# Patient Record
Sex: MALE | Race: WHITE | NOT HISPANIC OR LATINO
[De-identification: names, ages, dates, MRNs, and addresses within clinical notes are randomized per-mention and may not be internally consistent; named-entity substitution may affect disease eponyms.]

---

## 2017-03-02 ENCOUNTER — RECORDS - HEALTHEAST (OUTPATIENT)
Dept: ADMINISTRATIVE | Facility: OTHER | Age: 72
End: 2017-03-02

## 2018-01-01 ENCOUNTER — AMBULATORY - HEALTHEAST (OUTPATIENT)
Dept: SCHEDULING | Facility: CLINIC | Age: 73
End: 2018-01-01

## 2018-01-01 ENCOUNTER — RECORDS - HEALTHEAST (OUTPATIENT)
Dept: ADMINISTRATIVE | Facility: OTHER | Age: 73
End: 2018-01-01

## 2018-01-01 ENCOUNTER — OFFICE VISIT - HEALTHEAST (OUTPATIENT)
Dept: GERIATRICS | Facility: CLINIC | Age: 73
End: 2018-01-01

## 2018-01-01 ENCOUNTER — COMMUNICATION - HEALTHEAST (OUTPATIENT)
Dept: SLEEP MEDICINE | Facility: CLINIC | Age: 73
End: 2018-01-01

## 2018-01-01 ENCOUNTER — OFFICE VISIT - HEALTHEAST (OUTPATIENT)
Dept: SLEEP MEDICINE | Facility: CLINIC | Age: 73
End: 2018-01-01

## 2018-01-01 ENCOUNTER — AMBULATORY - HEALTHEAST (OUTPATIENT)
Dept: SLEEP MEDICINE | Facility: CLINIC | Age: 73
End: 2018-01-01

## 2018-01-01 ENCOUNTER — RECORDS - HEALTHEAST (OUTPATIENT)
Dept: LAB | Facility: CLINIC | Age: 73
End: 2018-01-01

## 2018-01-01 ENCOUNTER — OFFICE VISIT - HEALTHEAST (OUTPATIENT)
Dept: PULMONOLOGY | Facility: OTHER | Age: 73
End: 2018-01-01

## 2018-01-01 ENCOUNTER — AMBULATORY - HEALTHEAST (OUTPATIENT)
Dept: GERIATRICS | Facility: CLINIC | Age: 73
End: 2018-01-01

## 2018-01-01 DIAGNOSIS — R09.02 HYPOXIA: ICD-10-CM

## 2018-01-01 DIAGNOSIS — G47.33 OBSTRUCTIVE SLEEP APNEA: ICD-10-CM

## 2018-01-01 DIAGNOSIS — R29.818 SUSPECTED SLEEP APNEA: ICD-10-CM

## 2018-01-01 DIAGNOSIS — J44.9 COPD (CHRONIC OBSTRUCTIVE PULMONARY DISEASE) (H): ICD-10-CM

## 2018-01-01 DIAGNOSIS — J18.9 PNEUMONIA: ICD-10-CM

## 2018-01-01 DIAGNOSIS — J90 PLEURAL EFFUSION: ICD-10-CM

## 2018-01-01 DIAGNOSIS — I50.9 CHF EXACERBATION (H): ICD-10-CM

## 2018-01-01 DIAGNOSIS — J44.1 COPD EXACERBATION (H): ICD-10-CM

## 2018-01-01 DIAGNOSIS — J96.20 ACUTE ON CHRONIC RESPIRATORY FAILURE (H): ICD-10-CM

## 2018-01-01 DIAGNOSIS — R33.9 URINARY RETENTION: ICD-10-CM

## 2018-01-01 DIAGNOSIS — G47.10 HYPERSOMNIA: ICD-10-CM

## 2018-01-01 DIAGNOSIS — J42 CHRONIC BRONCHITIS (H): ICD-10-CM

## 2018-01-01 DIAGNOSIS — R06.83 SNORING: ICD-10-CM

## 2018-01-01 DIAGNOSIS — I26.09 ACUTE COR PULMONALE (H): ICD-10-CM

## 2018-01-01 LAB
ANION GAP SERPL CALCULATED.3IONS-SCNC: 5 MMOL/L (ref 5–18)
ANION GAP SERPL CALCULATED.3IONS-SCNC: 8 MMOL/L (ref 5–18)
BUN SERPL-MCNC: 16 MG/DL (ref 8–28)
BUN SERPL-MCNC: 32 MG/DL (ref 8–28)
CALCIUM SERPL-MCNC: 8.5 MG/DL (ref 8.5–10.5)
CALCIUM SERPL-MCNC: 8.6 MG/DL (ref 8.5–10.5)
CHLORIDE BLD-SCNC: 97 MMOL/L (ref 98–107)
CHLORIDE BLD-SCNC: 98 MMOL/L (ref 98–107)
CO2 SERPL-SCNC: 38 MMOL/L (ref 22–31)
CO2 SERPL-SCNC: 42 MMOL/L (ref 22–31)
CREAT SERPL-MCNC: 0.97 MG/DL (ref 0.7–1.3)
CREAT SERPL-MCNC: 1.16 MG/DL (ref 0.7–1.3)
GFR SERPL CREATININE-BSD FRML MDRD: >60 ML/MIN/1.73M2
GFR SERPL CREATININE-BSD FRML MDRD: >60 ML/MIN/1.73M2
GLUCOSE BLD-MCNC: 101 MG/DL (ref 70–125)
GLUCOSE BLD-MCNC: 107 MG/DL (ref 70–125)
HGB BLD-MCNC: 10.4 G/DL (ref 14–18)
HGB BLD-MCNC: 9.4 G/DL (ref 14–18)
POTASSIUM BLD-SCNC: 3.6 MMOL/L (ref 3.5–5)
POTASSIUM BLD-SCNC: 3.6 MMOL/L (ref 3.5–5)
SODIUM SERPL-SCNC: 144 MMOL/L (ref 136–145)
SODIUM SERPL-SCNC: 144 MMOL/L (ref 136–145)

## 2018-01-01 RX ORDER — CLOPIDOGREL BISULFATE 75 MG/1
75 TABLET ORAL
Status: SHIPPED | COMMUNITY
Start: 2018-01-01

## 2018-01-01 RX ORDER — ISOSORBIDE MONONITRATE 30 MG/1
30 TABLET, EXTENDED RELEASE ORAL
Status: SHIPPED | COMMUNITY
Start: 2018-01-01

## 2018-01-01 ASSESSMENT — MIFFLIN-ST. JEOR
SCORE: 1905.62
SCORE: 1897.46

## 2018-11-21 ENCOUNTER — RECORDS - HEALTHEAST (OUTPATIENT)
Dept: ADMINISTRATIVE | Facility: OTHER | Age: 73
End: 2018-11-21

## 2021-06-01 ENCOUNTER — RECORDS - HEALTHEAST (OUTPATIENT)
Dept: ADMINISTRATIVE | Facility: CLINIC | Age: 76
End: 2021-06-01

## 2021-06-02 ENCOUNTER — RECORDS - HEALTHEAST (OUTPATIENT)
Dept: ADMINISTRATIVE | Facility: CLINIC | Age: 76
End: 2021-06-02

## 2021-06-02 VITALS — HEIGHT: 70 IN | BODY MASS INDEX: 36.65 KG/M2 | WEIGHT: 256 LBS

## 2021-06-02 VITALS — HEIGHT: 70 IN | WEIGHT: 257.8 LBS | BODY MASS INDEX: 36.91 KG/M2

## 2021-06-16 PROBLEM — J44.1 COPD EXACERBATION (H): Status: ACTIVE | Noted: 2018-01-01

## 2021-06-16 PROBLEM — J96.22 ACUTE ON CHRONIC RESPIRATORY FAILURE WITH HYPOXIA AND HYPERCAPNIA (H): Status: ACTIVE | Noted: 2018-01-01

## 2021-06-16 PROBLEM — I50.9 CHF EXACERBATION (H): Status: ACTIVE | Noted: 2018-01-01

## 2021-06-16 PROBLEM — J96.21 ACUTE ON CHRONIC RESPIRATORY FAILURE WITH HYPOXIA AND HYPERCAPNIA (H): Status: ACTIVE | Noted: 2018-01-01

## 2021-06-20 NOTE — PROGRESS NOTES
Code Status:  UNKNOWN  Visit Type: Problem Visit     Facility:  [facility] [[id number]]         Facility Type: SNF (Skilled Nursing Facility, TCU)    History of Present Illness: Gurmeet Nettles is a 73 y.o. male when seen today for follow-up on the TCU.  Patient recently hospitalized on 8/20/2018.  Past medical history includes coronary artery disease status post CABG, COPD on home oxygen, obstructive sleep apnea, hypertension, hyperlipidemia and lung CA.  Patient recently hospitalized with increasing shortness of breath.  He was started on BiPAP.  He was treated for both COPD exacerbation as well as CHF exacerbation.  He underwent left thoracentesis with removal of 1 L of blood-tinged fluid from pleural effusion.  He continues on diuretics.  Continues with lower extremity edema 2+.  He is on 6-8 L of oxygen.  He reports shortness of breath is improving.  He does seem to recover quite rapidly.  During hospitalization he did have urinary retention.  Continues with Hernandez catheter.  Was also treated for possible pneumonia with oral antibiotics.  His weight is down about 9 pounds.    Today patient sitting up in bedside chair.  He is tolerating therapy.  His oxygen has been weaned down to 4 L nasal cannula.  Continues on CPAP at night. He continues with lower extremity edema on Lasix.  Patient denies any chest pain.  Feels shortness of breath is improving however continues with chronic shortness of breath with exertion.      Active Ambulatory Problems     Diagnosis Date Noted     Dyslipidemia      Obstructive sleep apnea      Essential hypertension      Coronary Artery Disease      Chronic bronchitis (H)      CHF exacerbation (H) 08/20/2018     COPD exacerbation (H) 08/20/2018     Acute on chronic respiratory failure with hypoxia and hypercapnia (H) 08/20/2018     Acute cor pulmonale (H)      Pleural effusion      Resolved Ambulatory Problems     Diagnosis Date Noted     No Resolved Ambulatory Problems      Past Medical History:   Diagnosis Date     CAD (coronary artery disease)      COPD (chronic obstructive pulmonary disease) (H)      Emphysema of lung (H)      Hyperlipidemia      Hypertension      Lung cancer (H)      RAVI (obstructive sleep apnea)        Current Outpatient Prescriptions   Medication Sig Note     albuterol (PROAIR HFA;PROVENTIL HFA;VENTOLIN HFA) 90 mcg/actuation inhaler Inhale 2 puffs every 6 (six) hours as needed for wheezing or shortness of breath.      aspirin 81 MG EC tablet Take 81 mg by mouth daily.      atorvastatin (LIPITOR) 40 MG tablet Take 40 mg by mouth at bedtime.      budesonide (PULMICORT) 180 mcg/actuation inhaler Inhale 1 puff 2 (two) times a day.      cholecalciferol, vitamin D3, 1,000 unit tablet Take 1,000 Units by mouth daily.      formoterol fumarate (PERFOROMIST) 20 mcg/2 mL nebulizer solution Take 20 mcg by nebulization every 12 (twelve) hours.      furosemide (LASIX) 80 MG tablet Take 80 mg by mouth 2 (two) times a day at 9am and 6pm.      ipratropium-albuterol (DUO-NEB) 0.5-2.5 mg/3 mL nebulizer Take 3 mL by nebulization 4 (four) times a day.      isosorbide mononitrate (IMDUR) 30 MG 24 hr tablet Take 30 mg by mouth daily. 8/21/2018: Last filled June 2018     lisinopril (PRINIVIL,ZESTRIL) 40 MG tablet Take 40 mg by mouth daily.      metoprolol succinate (TOPROL-XL) 100 MG 24 hr tablet Take 100 mg by mouth daily.      potassium chloride (KLOR-CON) 20 mEq packet Take 20 mEq by mouth 2 (two) times a day.      predniSONE (DELTASONE) 10 mg tablet 3 tabs daily for 3 days then 2 tabs daily for 3 days then 1 tab daily for 3 days then stop.      tamsulosin (FLOMAX) 0.4 mg cap Take 1 capsule (0.4 mg total) by mouth Daily after lunch.          Review of Systems   No fevers or chills. No headache, lightheadedness or dizziness.  Chronic SOB on oxygen at home, however feels shortness of breath is improving somewhat, no chest pains or palpitations. Appetite is good. No nausea,  vomiting, constipation or diarrhea.  Urinary retention with Hernandez catheter.  Tells me he did not have a catheter at home.  Chronic lower extremity edema.  Otherwise review of systems are negative.       Physical Exam   PHYSICAL EXAMINATION:  Vital signs: /62, heart rate 95, respirations 20, temperature 98.3, O2 sat 93% on 4 L.  Current weight 259..2 pounds.  General: Awake, Alert, oriented x3, appropriately, follows simple commands, conversant  HEENT:PERRLA, Pink conjunctiva, anicteric sclerae, moist oral mucosa  NECK: Supple, without any lymphadenopathy, or masses  CVS:  S1  S2, without murmur or gallop.   LUNG: Clear to auscultation.  No wheezes.  Occasional rhonchi that clears with cough.  O2 on at 4 L nasal cannula.  BACK: No kyphosis of the thoracic spine  ABDOMEN: Soft, nontender to palpation, with positive bowel sounds  : Hernandez catheter in place.:   EXTREMITIES: Moves  both upper and lower extremities with generalized weakness, 2+ pedal edema, no calf tenderness  SKIN: Warm and dry, no rashes or erythema noted  NEUROLOGIC: Intact, pulses palpable  PSYCHIATRIC: Cognition intact            Labs:    Recent Results (from the past 240 hour(s))   Basic Metabolic Panel   Result Value Ref Range    Sodium 142 136 - 145 mmol/L    Potassium 3.7 3.5 - 5.0 mmol/L    Chloride 93 (L) 98 - 107 mmol/L    CO2 44 (HH) 22 - 31 mmol/L    Anion Gap, Calculation 5 5 - 18 mmol/L    Glucose 109 70 - 125 mg/dL    Calcium 8.6 8.5 - 10.5 mg/dL    BUN 35 (H) 8 - 28 mg/dL    Creatinine 1.14 0.70 - 1.30 mg/dL    GFR MDRD Af Amer >60 >60 mL/min/1.73m2    GFR MDRD Non Af Amer >60 >60 mL/min/1.73m2   Platelet Count - every other day x 3   Result Value Ref Range    Platelets 164 140 - 440 thou/uL   Basic Metabolic Panel   Result Value Ref Range    Sodium 144 136 - 145 mmol/L    Potassium 3.6 3.5 - 5.0 mmol/L    Chloride 97 (L) 98 - 107 mmol/L    CO2 42 (HH) 22 - 31 mmol/L    Anion Gap, Calculation 5 5 - 18 mmol/L    Glucose 101 70 -  125 mg/dL    Calcium 8.6 8.5 - 10.5 mg/dL    BUN 32 (H) 8 - 28 mg/dL    Creatinine 1.16 0.70 - 1.30 mg/dL    GFR MDRD Af Amer >60 >60 mL/min/1.73m2    GFR MDRD Non Af Amer >60 >60 mL/min/1.73m2   Hemoglobin   Result Value Ref Range    Hemoglobin 9.4 (L) 14.0 - 18.0 g/dL   Basic Metabolic Panel   Result Value Ref Range    Sodium 144 136 - 145 mmol/L    Potassium 3.6 3.5 - 5.0 mmol/L    Chloride 98 98 - 107 mmol/L    CO2 38 (H) 22 - 31 mmol/L    Anion Gap, Calculation 8 5 - 18 mmol/L    Glucose 107 70 - 125 mg/dL    Calcium 8.5 8.5 - 10.5 mg/dL    BUN 16 8 - 28 mg/dL    Creatinine 0.97 0.70 - 1.30 mg/dL    GFR MDRD Af Amer >60 >60 mL/min/1.73m2    GFR MDRD Non Af Amer >60 >60 mL/min/1.73m2   Hemoglobin   Result Value Ref Range    Hemoglobin 10.4 (L) 14.0 - 18.0 g/dL         Assessment/Plan:  1. COPD exacerbation (H)     2. CHF exacerbation (H)     3. Acute on chronic respiratory failure (H)     4. Urinary retention     5. Pleural effusion     6. Obstructive sleep apnea       Patient with recent hospitalization for COPD as well as CHF exacerbation.  He is chronically on O2 at home 5 L.  Oxygen down to 4 L.  He continues to wear O2 at 6 L with CPAP at night.  He feels like the shortness of breath is improving somewhat.  Chronic with exertion.  CHF.  Continues with lower extremity edema.  He is on Lasix 80 mg twice daily.  This is his home dose.  He is down about 9 pounds.  Blood pressures on the lower side of normal.  He is asymptomatic.Hemoglobin 10.4.  Urinary retention during hospitalization.  He does continue Hernandez catheter in place.  Will DC his Hernandez catheter and initiate PVR protocol.        Electronically signed by: Kusum Reynaga, ESTEFANI

## 2021-06-20 NOTE — PROGRESS NOTES
Code Status:  UNKNOWN  Visit Type: Problem Visit     Facility:  [facility] [[id number]]         Facility Type: SNF (Skilled Nursing Facility, TCU)    History of Present Illness: Gurmeet Nettles is a 73 y.o. male when seen today for follow-up on the TCU.  Patient recently hospitalized on 820/2018.  Past medical history includes coronary artery disease status post CABG, COPD on home oxygen, obstructive sleep apnea, hypertension, hyperlipidemia and lung CA.  Patient recently hospitalized with increasing shortness of breath.  He was started on BiPAP.  He was treated for both COPD exacerbation as well as CHF exacerbation.  He underwent left thoracentesis with removal of 1 L of blood-tinged fluid from pleural effusion.  He continues on diuretics.  Continues with lower extremity edema 2+.  He is on 6-8 L of oxygen.  He reports shortness of breath is improving.  He does seem to recover quite rapidly.  During hospitalization he did have urinary retention.  Continues with Hernandez catheter.  Was also treated for possible pneumonia with oral antibiotics.  His weight is down about 9 pounds.      Active Ambulatory Problems     Diagnosis Date Noted     Dyslipidemia      Obstructive sleep apnea      Essential hypertension      Coronary Artery Disease      Chronic bronchitis (H)      CHF exacerbation (H) 08/20/2018     COPD exacerbation (H) 08/20/2018     Acute on chronic respiratory failure with hypoxia and hypercapnia (H) 08/20/2018     Acute cor pulmonale (H)      Pleural effusion      Resolved Ambulatory Problems     Diagnosis Date Noted     No Resolved Ambulatory Problems     Past Medical History:   Diagnosis Date     CAD (coronary artery disease)      COPD (chronic obstructive pulmonary disease) (H)      Emphysema of lung (H)      Hyperlipidemia      Hypertension      Lung cancer (H)      RAVI (obstructive sleep apnea)        Current Outpatient Prescriptions   Medication Sig Note     albuterol (PROAIR  HFA;PROVENTIL HFA;VENTOLIN HFA) 90 mcg/actuation inhaler Inhale 2 puffs every 6 (six) hours as needed for wheezing or shortness of breath.      aspirin 81 MG EC tablet Take 81 mg by mouth daily.      atorvastatin (LIPITOR) 40 MG tablet Take 40 mg by mouth at bedtime.      budesonide (PULMICORT) 180 mcg/actuation inhaler Inhale 1 puff 2 (two) times a day.      cholecalciferol, vitamin D3, 1,000 unit tablet Take 1,000 Units by mouth daily.      formoterol fumarate (PERFOROMIST) 20 mcg/2 mL nebulizer solution Take 20 mcg by nebulization every 12 (twelve) hours.      furosemide (LASIX) 80 MG tablet Take 80 mg by mouth 2 (two) times a day at 9am and 6pm.      ipratropium-albuterol (DUO-NEB) 0.5-2.5 mg/3 mL nebulizer Take 3 mL by nebulization 4 (four) times a day.      isosorbide mononitrate (IMDUR) 30 MG 24 hr tablet Take 30 mg by mouth daily. 8/21/2018: Last filled June 2018     lisinopril (PRINIVIL,ZESTRIL) 40 MG tablet Take 40 mg by mouth daily.      metoprolol succinate (TOPROL-XL) 100 MG 24 hr tablet Take 100 mg by mouth daily.      potassium chloride (KLOR-CON) 20 mEq packet Take 20 mEq by mouth 2 (two) times a day.      predniSONE (DELTASONE) 10 mg tablet 3 tabs daily for 3 days then 2 tabs daily for 3 days then 1 tab daily for 3 days then stop.      tamsulosin (FLOMAX) 0.4 mg cap Take 1 capsule (0.4 mg total) by mouth Daily after lunch.          Review of Systems   No fevers or chills. No headache, lightheadedness or dizziness.  Chronic SOB on oxygen at home, no chest pains or palpitations. Appetite is good. No nausea, vomiting, constipation or diarrhea.  Urinary retention with Hernandez catheter.  Chronic lower extremity edema.  Otherwise review of systems are negative.       Physical Exam   PHYSICAL EXAMINATION:  Vital signs: /74, heart rate 101, respirations 22, O2 sat 94% on 6 L, temperature 98.3.  Current weight 265 pounds.  General: Awake, Alert, oriented x3, appropriately, follows simple commands,  conversant  HEENT:PERRLA, Pink conjunctiva, anicteric sclerae, moist oral mucosa  NECK: Supple, without any lymphadenopathy, or masses  CVS:  S1  S2, without murmur or gallop.   LUNG: Clear to auscultation, No wheezes, rales or rhonci.  BACK: No kyphosis of the thoracic spine  ABDOMEN: Soft, nontender to palpation, with positive bowel sounds  : Hernandez catheter in place.:   EXTREMITIES: Moves  both upper and lower extremities with generalized weakness, 2+ pedal edema, no calf tenderness  SKIN: Warm and dry, no rashes or erythema noted  NEUROLOGIC: Intact, pulses palpable  PSYCHIATRIC: Cognition intact            Labs:    Recent Results (from the past 240 hour(s))   Basic Metabolic Panel   Result Value Ref Range    Sodium 142 136 - 145 mmol/L    Potassium 3.7 3.5 - 5.0 mmol/L    Chloride 93 (L) 98 - 107 mmol/L    CO2 44 (HH) 22 - 31 mmol/L    Anion Gap, Calculation 5 5 - 18 mmol/L    Glucose 109 70 - 125 mg/dL    Calcium 8.6 8.5 - 10.5 mg/dL    BUN 35 (H) 8 - 28 mg/dL    Creatinine 1.14 0.70 - 1.30 mg/dL    GFR MDRD Af Amer >60 >60 mL/min/1.73m2    GFR MDRD Non Af Amer >60 >60 mL/min/1.73m2   Platelet Count - every other day x 3   Result Value Ref Range    Platelets 164 140 - 440 thou/uL   Basic Metabolic Panel   Result Value Ref Range    Sodium 144 136 - 145 mmol/L    Potassium 3.6 3.5 - 5.0 mmol/L    Chloride 97 (L) 98 - 107 mmol/L    CO2 42 (HH) 22 - 31 mmol/L    Anion Gap, Calculation 5 5 - 18 mmol/L    Glucose 101 70 - 125 mg/dL    Calcium 8.6 8.5 - 10.5 mg/dL    BUN 32 (H) 8 - 28 mg/dL    Creatinine 1.16 0.70 - 1.30 mg/dL    GFR MDRD Af Amer >60 >60 mL/min/1.73m2    GFR MDRD Non Af Amer >60 >60 mL/min/1.73m2   Hemoglobin   Result Value Ref Range    Hemoglobin 9.4 (L) 14.0 - 18.0 g/dL         Assessment/Plan:  1. COPD exacerbation (H)     2. CHF exacerbation (H)     3. Acute on chronic respiratory failure (H)     4. Obstructive sleep apnea     5. Pleural effusion     6. Pneumonia     7. Urinary retention        Patient with recent hospitalization for COPD as well as CHF exacerbation.  He is chronically on O2 at home 5 L.  Now 6-8 L.  Obstructive sleep apnea.  He does use 8 L of oxygen with his BiPAP at night.  He did undergo thoracentesis and had 1 L fluid removed.  Was treated for pneumonia with oral antibiotics.  He has completed this.  He does continue on Lasix 80 mg twice daily.  He is on a steroid taper.  Lung sounds diminished in the bases.  No cough on exam.  He does have 2-3+ edema in his lower extremities.  He is down about 9 pounds since admit.  We will continue to monitor daily weights.  We will follow-up with laboratory next week including CBC and BMP and BNP.  Urinary retention during hospitalization.  He does continue Hernandez catheter in place.  Will attempt to DC his Hernandez catheter.  He is on Flomax.        35 minutes spent of which greater than 50% was face to face communication with the patient about above plan of care    Electronically signed by: Kusum Reynaga CNP

## 2021-06-20 NOTE — PROGRESS NOTES
Order for sleep study received and reviewed.  This patient sees Dr. Mcadams at [facility] and has an established history with her. It seems like patient already contacted [facility] regarding his equipment.

## 2021-06-20 NOTE — PROGRESS NOTES
[facility]      Facility:    [facility] [[id number]]  Code Status: UNKNOWN      Chief Complaint/Reason for Visit:  Chief Complaint   Patient presents with     H & P     CHF exacerbation, COPD exacerbation, chronic bronchitis, obstructive sleep apnea, essential hypertension, pleural effusion, possible pneumonia.  Acute cor pulmonale, acute on chronic respiratory failure with hypoxia as well as hypercapnia.       HPI:   Gurmeet is a 73 y.o. male who was recently admitted to the hospital 8/20/2018.  He does have multiple medical problems including coronary disease with status post CABG as well as severe COPD who is on 5 L during the day of home oxygen.  He has obstructive sleep apnea and is on CPAP at night.  He does a history of squamous cell carcinoma status post radiation therapy as well as cardiomyopathy.    He came to the hospital on the above admission date with increasing shortness of breath.  He was started on BiPAP and was known to have an exacerbation of CHF as well as likely exacerbation of COPD.  He was diuresed put on BiPAP and underwent left thoracentesis for removal of 1 L of blood-tinged fluid for pleural effusion.  Symptoms did improve with this but however his hospitalization was complicated by urinary retention and hematuria from traumatic Hernandez insertion and nocturnal hypoxemia with his CPAP.  Overall he did improve his severe COPD with chronic hypoxia and decrease steroids by 10 mg every 3 days was recommended.  He likely had some pneumonia and was put on Levaquin ×5 days and is chronic diastolic heart failure.  He did have exudative pleural effusion and he was treated appropriately and transferred here to the TCU at [facility] in stable condition.    Times today that his breathing is getting better but is not back to his baseline function he claims overall goal he wants to be better than he was in his normal state.  Given the fact that he has  COPD as well as congestive heart failure and recent pneumonia we will do our best to reach his goals.  Otherwise he is doing okay he has severe lower extremity edema at this time he claims is about baseline but he thinks it is a little bit worse at this time.    Past Medical History:  Past Medical History:   Diagnosis Date     CAD (coronary artery disease)      COPD (chronic obstructive pulmonary disease) (H)      Emphysema of lung (H)      Hyperlipidemia      Hypertension      Lung cancer (H)      RAVI (obstructive sleep apnea)            Surgical History:  Past Surgical History:   Procedure Laterality Date     CARDIAC CATHETERIZATION      stent to LAD     VITRECTOMY         Family History:   History reviewed. No pertinent family history.    Social History:    Social History     Social History     Marital status:      Spouse name: N/A     Number of children: N/A     Years of education: N/A     Social History Main Topics     Smoking status: Former Smoker     Smokeless tobacco: Never Used     Alcohol use No     Drug use: No     Sexual activity: Not Asked     Other Topics Concern     None     Social History Narrative          Review of Systems   Constitutional:        Patient denies any pain fevers chills nausea vomiting diarrhea change in vision hearing taste or smell weakness one side the other chest pain.  He does have shortness of breath chronically and he was supposed on 8 L at night with his CPAP.  This is what he was on at home and that was reiterated in the discharge summary.  The remainder the review of systems is negative.       Vitals:    08/29/18 1233   BP: 106/45   Pulse: 92   Resp: 16   Temp: 99.1  F (37.3  C)   SpO2: 93%       Physical Exam   Constitutional: No distress.   HENT:   Head: Normocephalic and atraumatic.   Nose: Nose normal.   Mouth/Throat: No oropharyngeal exudate.   Eyes: Conjunctivae are normal. Right eye exhibits no discharge. Left eye exhibits no discharge. No scleral icterus.    Neck: Neck supple. No thyromegaly present.   Cardiovascular: Normal rate and regular rhythm.    Pulmonary/Chest: Effort normal.   Patient had decreased inspiratory volume at this time with decreased lung sounds in the left lower lobe.   Abdominal: Soft. Bowel sounds are normal. He exhibits no distension.   Musculoskeletal: He exhibits edema.   Neurological: He is alert. No cranial nerve deficit. He exhibits normal muscle tone.   Skin: Skin is warm and dry. He is not diaphoretic.   Psychiatric: He has a normal mood and affect. His behavior is normal.       Medication List:  Current Outpatient Prescriptions   Medication Sig     albuterol (PROAIR HFA;PROVENTIL HFA;VENTOLIN HFA) 90 mcg/actuation inhaler Inhale 2 puffs every 6 (six) hours as needed for wheezing or shortness of breath.     aspirin 81 MG EC tablet Take 81 mg by mouth daily.     atorvastatin (LIPITOR) 40 MG tablet Take 40 mg by mouth at bedtime.     budesonide (PULMICORT) 180 mcg/actuation inhaler Inhale 1 puff 2 (two) times a day.     cholecalciferol, vitamin D3, 1,000 unit tablet Take 1,000 Units by mouth daily.     formoterol fumarate (PERFOROMIST) 20 mcg/2 mL nebulizer solution Take 20 mcg by nebulization every 12 (twelve) hours.     furosemide (LASIX) 80 MG tablet Take 80 mg by mouth 2 (two) times a day at 9am and 6pm.     ipratropium-albuterol (DUO-NEB) 0.5-2.5 mg/3 mL nebulizer Take 3 mL by nebulization 4 (four) times a day.     isosorbide mononitrate (IMDUR) 30 MG 24 hr tablet Take 30 mg by mouth daily.     levoFLOXacin (LEVAQUIN) 750 MG tablet Take 1 tablet (750 mg total) by mouth daily for 1 day.     lisinopril (PRINIVIL,ZESTRIL) 40 MG tablet Take 40 mg by mouth daily.     metoprolol succinate (TOPROL-XL) 100 MG 24 hr tablet Take 100 mg by mouth daily.     potassium chloride (KLOR-CON) 20 mEq packet Take 20 mEq by mouth 2 (two) times a day.     predniSONE (DELTASONE) 10 mg tablet 3 tabs daily for 3 days then 2 tabs daily for 3 days then 1 tab  daily for 3 days then stop.     tamsulosin (FLOMAX) 0.4 mg cap Take 1 capsule (0.4 mg total) by mouth Daily after lunch.       Labs: Not available in the EMR.      Assessment:    ICD-10-CM    1. COPD exacerbation (H) J44.1    2. CHF exacerbation (H) I50.9    3. Acute on chronic respiratory failure (H) J96.20    4. Obstructive sleep apnea G47.33    5. Pleural effusion J90    6. Chronic bronchitis (H) J42    7. Acute cor pulmonale (H) I26.09        Plan: Plan at this time will check basic metabolic profile hemoglobin tomorrow secondary to hematoma and bloody pleural effusion.  Basic metabolic profile is done secondary Lasix therapy.  If he can we will check his weights on a daily basis notified of any 2 pound weight changes.  There are just diuresis him a little bit more but I need to know what his basic metabolic profile his kidney function is at this time.  His practitioner will follow up tomorrow on this.  This is Baldev August end of dictation.        Electronically signed by: Baldev August,

## 2021-06-21 NOTE — PROGRESS NOTES
Dear Dr. Chloe Motta, Do  [address]  [address]  [place] [address]    Thank you for the opportunity to participate in the care of . Gurmeet Nettles.    He is a 73 y.o. male who comes to the clinic for the transfer of care of his obstructive sleep apnea.  From the best of what I can decipher from the patient's recollection as well as the notes from care everywhere.  The patient has been diagnosed with severe COPD in the past and is on nasal cannula oxygen both during the daytime and nighttime.  There is also a history of pulmonary carcinoma status post radiation therapy.  He also has a history of coronary disease as well as heart failure.  The patient was recently hospitalized and had an extended stay at a skilled nursing facility in which he mentions that 1 of his lungs had such severe pleural effusion that he needed to be drained.  We do not have the original sleep study here in clinic today and he did not bring his CPAP machine with him.  He did bring his nasal cannula oxygen with him today.  He was getting his pulmonary care from [facility] but declined his pulmonary physicians offered to get another titration study and [facility] to qualify him for a higher cognitive level machine.  He would like to switch both his pulmonary and sleep care over to [facility].  We will make every attempt to try to obtain the original sleep study through release of information.  Without pressure therapy the patient states that he would be extremely tired during the day.  He would also have significant pauses in his breathing during sleep followed by loud snoring.  The patient did appear extremely short of breath despite being on nasal cannula oxygen during this clinic visit.  I did offer the patient the option of being evaluated by the emergency room but he declined.  His oxygen level did improve after he was able to sit down and calm down a bit.  Patient's review of system is otherwise unremarkable.     Ideal  Sleep-Wake Cycle(devoid of societal pressure):    Patient would try to initiate sleep at around 10 PM with a sleep latency of 10 minutes. The patient would have 4 nocturnal awakening. Final wake up time is around 6 AM.    Past Medical History  Past Medical History:   Diagnosis Date     CAD (coronary artery disease)      COPD (chronic obstructive pulmonary disease) (H)      Emphysema of lung (H)      Hyperlipidemia      Hypertension      Lung cancer (H)      RAVI (obstructive sleep apnea)         Past Surgical History  Past Surgical History:   Procedure Laterality Date     CARDIAC CATHETERIZATION      stent to LAD     VITRECTOMY          Meds  Current Outpatient Prescriptions   Medication Sig Dispense Refill     albuterol (PROAIR HFA;PROVENTIL HFA;VENTOLIN HFA) 90 mcg/actuation inhaler Inhale 2 puffs every 6 (six) hours as needed for wheezing or shortness of breath.       aspirin 81 MG EC tablet Take 81 mg by mouth daily.       atorvastatin (LIPITOR) 40 MG tablet Take 40 mg by mouth at bedtime.       budesonide (PULMICORT) 180 mcg/actuation inhaler Inhale 1 puff 2 (two) times a day.       cholecalciferol, vitamin D3, 1,000 unit tablet Take 1,000 Units by mouth daily.       clopidogrel (PLAVIX) 75 mg tablet Take 75 mg by mouth.       formoterol fumarate (PERFOROMIST) 20 mcg/2 mL nebulizer solution Take 20 mcg by nebulization every 12 (twelve) hours.       furosemide (LASIX) 80 MG tablet Take 80 mg by mouth 2 (two) times a day at 9am and 6pm.       ipratropium-albuterol (DUO-NEB) 0.5-2.5 mg/3 mL nebulizer Take 3 mL by nebulization 4 (four) times a day. 3 vial 0     isosorbide mononitrate (IMDUR) 30 MG 24 hr tablet Take 30 mg by mouth daily.       isosorbide mononitrate (IMDUR) 30 MG 24 hr tablet Take 30 mg by mouth.       lisinopril (PRINIVIL,ZESTRIL) 40 MG tablet Take 40 mg by mouth daily.       metoprolol succinate (TOPROL-XL) 100 MG 24 hr tablet Take 100 mg by mouth daily.       potassium chloride (KLOR-CON) 20 mEq  packet Take 20 mEq by mouth 2 (two) times a day. 1 packet 0     predniSONE (DELTASONE) 10 mg tablet 3 tabs daily for 3 days then 2 tabs daily for 3 days then 1 tab daily for 3 days then stop. 18 tablet 0     tamsulosin (FLOMAX) 0.4 mg cap Take 1 capsule (0.4 mg total) by mouth Daily after lunch. 30 capsule 0     No current facility-administered medications for this visit.         Allergies  Review of patient's allergies indicates no known allergies.     Social History  Social History     Social History     Marital status:      Spouse name: N/A     Number of children: N/A     Years of education: N/A     Occupational History     Not on file.     Social History Main Topics     Smoking status: Former Smoker     Smokeless tobacco: Never Used     Alcohol use No     Drug use: No     Sexual activity: Not on file     Other Topics Concern     Not on file     Social History Narrative        Family History  No family history on file.     Review of Systems:  Constitutional: Negative except as noted in HPI.   Eyes: Negative except as noted in HPI.   ENT: Negative except as noted in HPI.   Cardiovascular: Negative except as noted in HPI.   Respiratory: Negative except as noted in HPI.   Gastrointestinal: Negative except as noted in HPI.   Genitourinary: Negative except as noted in HPI.   Musculoskeletal: Negative except as noted in HPI.   Integumentary: Negative except as noted in HPI.   Neurological: Negative except as noted in HPI.   Psychiatric: Negative except as noted in HPI.   Endocrine: Negative except as noted in HPI.   Hematologic/Lymphatic: Negative except as noted in HPI.      STOP BANG 10/26/2018   Do you snore loudly (louder than talking or loud enough to be heard through closed doors)? 0   Do you often feel tired, fatigued, or sleepy during daytime? 1   Has anyone observed you stop breathing in your sleep? 0   Do you have or are you being treated for high blood pressure? 1   BMI more than 35 kg/m2 1   Age over  "50 years old? 1   Neck circumference greater than 16 inches? 1   Gender male? 1   Total Score 6   Epworths Sleepiness Scale 10/26/2018   Sitting and reading 1   Watching TV 1   Sitting, inactive in a public place (e.g. a theatre or a meeting) 0   As a passenger in a car for an hour without a break 0   Lying down to rest in the afternoon when circumstances permit 0   Sitting and talking to someone 0   Sitting quietly after a lunch without alcohol 0   In a car, while stopped for a few minutes in traffic 0   Total score 2   Rooming 10/26/2018   Usual bedtime 10pm   Sleep Latency 10 minutes   Awakenings 4 times   Wake Up Time 4:30am   Weekends varies   Energy Drinks no   Coffee 4 cups qd   Cola no   Difficulty falling asleep No   Difficulty staying asleep No   Excessive daytime tiredness Yes   Excessive daytime sleepiness No   Dozing off while driving No   Shift Worker No   Sleep Walking? No   Sleep Talking? No   Kicking or punching? Yes   Restless legs symptoms No       Physical Exam:  /52  Pulse 70  Ht 5' 10\" (1.778 m)  Wt (!) 256 lb (116.1 kg)  SpO2 91%  BMI 36.73 kg/m2  BMI:Body mass index is 36.73 kg/(m^2).   GEN: NAD, the patient walks with a walker and has nasal cannula 5 L/min of oxygen bleeding in.  Head: Normocephalic.  EYES: PERRLA, EOMI  ENT: Oropharynx is clear, mallampatti class 4+ airway.   Nasal mucosa is moist without erythema  Neck : Thyroid is within normal limits. Neck size: 19.5 inches  CV: Regular rate and rhythm, S1 & S2 positive.  LUNGS: Bilateral breathsounds heard.   ABDOMEN: Positive bowel sounds in all quadrants, soft, no rebound or guarding  MUSCULOSKELETAL: Bilateral 2+ leg swelling  SKIN: warm, dry, no rashes  Neurological: Alert, oriented to time, place, and person.  Psych: normal mood, normal affect     Labs/Studies:     Lab Results   Component Value Date    WBC 9.9 08/23/2018    HGB 10.4 (L) 09/04/2018    HCT 32.1 (L) 08/23/2018    MCV 95 08/23/2018     08/26/2018 "         Chemistry        Component Value Date/Time     09/04/2018 0733    K 3.6 09/04/2018 0733    CL 98 09/04/2018 0733    CO2 38 (H) 09/04/2018 0733    BUN 16 09/04/2018 0733    CREATININE 0.97 09/04/2018 0733     09/04/2018 0733        Component Value Date/Time    CALCIUM 8.5 09/04/2018 0733    ALKPHOS 68 08/23/2018 0613    AST 14 08/23/2018 0613    ALT 12 08/23/2018 0613    BILITOT 0.7 08/23/2018 0613            No results found for: FERRITIN  Lab Results   Component Value Date    TSH 0.11 (L) 02/11/2011         Assessment and Plan:  In summary Gurmeet Nettles is a 73 y.o. year old male here for transfer of care.  1.  Suspected sleep apnea   Mr. Gurmeet Nettles has high risk for obstructive sleep apnea based on the history of witnessed apnea, snoring and a crowded airway. I educated the patient on the underlying pathophysiology of obstructive sleep apnea. We reviewed the risks associated with sleep apnea, including increased cardiovascular risk and overall death. We will make every attempt to try to obtain his original sleep study from [facility].  The patient has signed a release of information form today.  Once we obtain the original sleep study and is able to evaluate his compliance download data from his CPAP machine, I may consider ordering an in lab titration study with [facility].  The titration study will need to be done with transcutaneous CO2 monitoring.  2.  Hypersomnia  3.  Snoring  4.  COPD/hypoxia  We will also put in the order for the patient to transfer his pulmonary care to [facility].        Patient verbalized understanding of these issues, agrees with the plan and all questions were answered today. Patient was given an opportuntity to voice any other symptoms or concerns not listed above. Patient did not have any other symptoms or concerns.      Return to clinic in 8 weeks.     Mandeep Plummer DO  Board Certified in Internal Medicine and Sleep Medicine  [facility].    (Note created with Dragon voice recognition and unintended spelling errors and word substitutions may occur)

## 2021-06-21 NOTE — PROGRESS NOTES
"PULMONARY CLINIC FOLLOW UP NOTE    History:     HPI: Gurmeet Nettles is a 73 y.o. male, former smoker for follow-up.  Patient was recently seen at the hospital on August 2018 when he presented with shortness of breath.  Was evaluated by my colleague, Dr. Roca.     When he was in the hospital, he underwent thoracentesis where 1 L of blood-tinged fluid was removed.  Cytology was negative.  He previously followed up with Dr. Mcadams from [facility] but has decided to follow-up with [facility] after his hospitalization.  When he was hospitalized, patient was also placed on antibiotics as well as prednisone.  Does have COPD and is on 5 L oxygen at rest.    Interval History: Patient has been doing well since his hospitalization.  He endorses an occasional cough that has nonproductive.  He denies any hemoptysis.  No fevers or chills.  He is able to walk less than a block. He uses a walker for ambulation.  For his COPD, he is currently on Duoneb 4 x daily, albuterol inhalers, budesonide inhaler, and performist nebulzier.  He is on 4 liters oxygen during the day with exertion/rest and 8 liters at night with CPAP.    PMHx/PSHx:   History of lung cancer diagnosed in 2014 status post radiation  COPD  CAD  Obstructive sleep apnea  Hypertension  Hyperlipidemia  Hyper congestive heart failure    Social Hx:   Former smoker. Quit smoking about 5 years. Smoked 1 ppd for about 50 years  Worked in construction    ROS: 10 point review of system done. Pertinent findings are noted in the HPI.    Exam/Data:   /58  Pulse 84  Resp 24  Ht 5' 10\" (1.778 m)  Wt (!) 257 lb 12.8 oz (116.9 kg)  SpO2 94% Comment: 4 LPM  BMI 36.99 kg/m2, Body mass index is 36.99 kg/(m^2).  GEN: comfortable, NAD  HEENT: NCAT, EMOI, mmm  LN: no cervical LAD   CVS: S1S2, RRR  Lung: diminished BS  Abd: soft, nt, + BS. No masses  Ext: 2-3 edema  Neuro: nonfocal  Skin: no visible rash  Vasc: intact radial pulses bilaterally  Musculoskeletal: FROM all " extremities  Psych: normal affect    Data:   Labs and Imaging personally reviewed.  Pertinent findings include:    CT chest done on August 2018:  1.  Mild enlargement of the moderate size right pleural effusion. Increased atelectasis right lung.  2.  New infiltrate posterior left lung base suggesting pneumonia and aspiration should be considered.  3.  Cholelithiasis.    Echo 2018    Technically limited study.    Left ventricle ejection fraction is normal. The estimated left ventricular ejection fraction is 55%.    Normal right ventricular size and systolic function.    No significant valve disease.    When compared to the previous study dated 11/25/2013, no significant change.    CT chest done on 3/2018 at outside facility: Official report.  Of the right hilar and suprahilar masslike consolidation/soft tissue is unchanged.  The partial obstruction of the anterior segmental bronchus to the upper lobe is also stable.    CT chest done on 1/2018 at [facility], official report.  The small right-sided pleural effusion has decreased in the interval.  Ovoid opacity in the right upper lobe along the fissure superiorly has decreased in size and suspect some of this might have reflected atelectasis with fluid tracking into this region.  Right upper lobe curvilinear platelike opacity stable.  No new parenchymal disease.  Underlying emphysema.  Right basilar pleural calcification.    CT chest 8/2018: [facility] official report.  Moderate right-sided pleural effusion increased in size since January.  No change in the curvilinear chronic fibroatelectasis in the right middle lobe and no change in the vague ovoid area of enhancement along the fissure in the right lower lobe.  Although volume is certainly waxed and waned on recent exams, and has clearly increased since 2016.    Assessment/Plan:     Gurmeet Nettles is a 73 y.o. male, former smoker, with history of COPD on home oxygen, history of right upper lobe lung  cancer status post stereotactic radiation back in 2014, history of radiation pneumonitis, congestive heart failure, retention, and hyperlipidemia who is here for follow-up.     Recommendations:  Continue duonebs, albuterol inhaler as needed, and pulmicort nebulizer  Stop using perfromist as you are using duonebs daily 4 times a day already. If symptoms worsen off performist, then go back to Performist nebulizer  Gargle/swish/spit after using pulmicort nebulizer  PFT's next visit  Bring your medications next visit (re-enforced with pt multiple times)  Signed release to have CT scans of chest forwarded and last note form oncologist at [facility]  Pulmonary rehab referral made  Maintain euvolemia (states he is on lasix but I don't see it on his medication list)  Continue CPAP  CXR next visit to ensure resolution of infiltrates/fluids noted on CT when hospitalized at [facility] on 5/2018  Swallow evaluation given concern for aspiration based on CT  Will likely need repeat CT chest down the road given his history of lung cancer (he notes that Dr Mcadams does that for him every 6 months). I did not get a change to review his images as we don't have them uploaded yet in our system        FOLLOW UP: with Dr Euceda in 2 months    Zuleyka Menezes MD  Pulmonary and Critical Care Medicine  Electronically Signed on 10/31/2018    Current Outpatient Prescriptions   Medication Sig Dispense Refill     albuterol (PROAIR HFA;PROVENTIL HFA;VENTOLIN HFA) 90 mcg/actuation inhaler Inhale 2 puffs every 6 (six) hours as needed for wheezing or shortness of breath.       aspirin 81 MG EC tablet Take 81 mg by mouth daily.       atorvastatin (LIPITOR) 40 MG tablet Take 40 mg by mouth at bedtime.       budesonide (PULMICORT) 180 mcg/actuation inhaler Inhale 1 puff 2 (two) times a day.       cholecalciferol, vitamin D3, 1,000 unit tablet Take 1,000 Units by mouth daily.       clopidogrel (PLAVIX) 75 mg tablet Take 75 mg by mouth.       formoterol fumarate  (PERFOROMIST) 20 mcg/2 mL nebulizer solution Take 20 mcg by nebulization every 12 (twelve) hours.       furosemide (LASIX) 80 MG tablet Take 80 mg by mouth 2 (two) times a day at 9am and 6pm.       ipratropium-albuterol (DUO-NEB) 0.5-2.5 mg/3 mL nebulizer Take 3 mL by nebulization 4 (four) times a day. 3 vial 0     isosorbide mononitrate (IMDUR) 30 MG 24 hr tablet Take 30 mg by mouth daily.       isosorbide mononitrate (IMDUR) 30 MG 24 hr tablet Take 30 mg by mouth.       lisinopril (PRINIVIL,ZESTRIL) 40 MG tablet Take 40 mg by mouth daily.       metoprolol succinate (TOPROL-XL) 100 MG 24 hr tablet Take 100 mg by mouth daily.       potassium chloride (KLOR-CON) 20 mEq packet Take 20 mEq by mouth 2 (two) times a day. 1 packet 0     tamsulosin (FLOMAX) 0.4 mg cap Take 1 capsule (0.4 mg total) by mouth Daily after lunch. 30 capsule 0     No current facility-administered medications for this visit.      No Known Allergies    Meds and Allergies: See EHR for the updated medication list and Allergies. These were reviewed.     Much or all of the text in this note was generated through the use of the Dragon Dictate voice-to-text software. Errors in spelling or words which seem out of context are unintentional. Sound alike errors, in particular, may have escaped editing.

## 2021-06-21 NOTE — PROGRESS NOTES
LILIA faxed to [facility] for Sleep Records today 10/26/18. Fax was confirmed to go through. We have received these records now. They have been sent to scanning, and a copy given to .